# Patient Record
Sex: MALE | Race: WHITE | NOT HISPANIC OR LATINO | URBAN - METROPOLITAN AREA
[De-identification: names, ages, dates, MRNs, and addresses within clinical notes are randomized per-mention and may not be internally consistent; named-entity substitution may affect disease eponyms.]

---

## 2022-05-23 ENCOUNTER — OFFICE VISIT (OUTPATIENT)
Dept: OPHTHALMOLOGY | Facility: CLINIC | Age: 32
End: 2022-05-23
Payer: COMMERCIAL

## 2022-05-23 DIAGNOSIS — H10.32 ACUTE CONJUNCTIVITIS OF LEFT EYE, UNSPECIFIED ACUTE CONJUNCTIVITIS TYPE: Primary | ICD-10-CM

## 2022-05-23 PROCEDURE — 92002 INTRM OPH EXAM NEW PATIENT: CPT | Performed by: OPHTHALMOLOGY

## 2022-05-23 RX ORDER — SULFACETAMIDE SODIUM AND PREDNISOLONE SODIUM PHOSPHATE 100; 2.3 MG/ML; MG/ML
1 SOLUTION/ DROPS OPHTHALMIC 3 TIMES DAILY
Qty: 5 ML | Refills: 0 | Status: SHIPPED | OUTPATIENT
Start: 2022-05-23

## 2022-05-23 ASSESSMENT — TONOMETRY
OS_IOP_MMHG: 17
OD_IOP_MMHG: 15
IOP_METHOD: ICARE

## 2022-05-23 ASSESSMENT — SLIT LAMP EXAM - LIDS
COMMENTS: 1+ MEIBOMIAN GLAND DYSFUNCTION
COMMENTS: 1+ MEIBOMIAN GLAND DYSFUNCTION

## 2022-05-23 ASSESSMENT — CONF VISUAL FIELD
OD_NORMAL: 1
OS_NORMAL: 1

## 2022-05-23 ASSESSMENT — VISUAL ACUITY
OD_SC: 20/20
METHOD: SNELLEN - LINEAR
OS_SC: 20/20

## 2022-05-23 NOTE — PATIENT INSTRUCTIONS
Start Sulfa/Pred drop three times daily left eye, for approximately 1 week.  Then you may use artificial tears up to 4 times daily both eyes. (Refresh Tears, Systane Ultra/Balance, or Theratears)   Can try and over the counter allergy drop (Zaditor) both eyes twice daily as needed.   Call if problem persist.  Cuong Anand M.D.  [phone]

## 2022-05-23 NOTE — PROGRESS NOTES
Current Eye Medications:  Similasin allergy drops     Subjective:  Here for left eye evaluation.   Patient complains of left eye being red, swollen, itchy, and painful. Started 2 weeks ago. Symptoms have improved but eye continues to feel slightly irritated.   Patient has tried using allergy eye drop, which did not offer any relief. Overall vision is good at distance and near.       Objective:  See Ophthalmology Exam.       Assessment:  Mild conjunctivitis left eye of indeterminate etiology.      Plan:  Start Sulfa/Pred drop three times daily left eye, for approximately 1 week.  Then you may use artificial tears up to 4 times daily both eyes. (Refresh Tears, Systane Ultra/Balance, or Theratears)   Can try and over the counter allergy drop (Zaditor) both eyes twice daily as needed.   Call if problem persist.  Cuong Anand M.D.  [phone]